# Patient Record
Sex: MALE | Race: BLACK OR AFRICAN AMERICAN
[De-identification: names, ages, dates, MRNs, and addresses within clinical notes are randomized per-mention and may not be internally consistent; named-entity substitution may affect disease eponyms.]

---

## 2017-09-07 ENCOUNTER — HOSPITAL ENCOUNTER (EMERGENCY)
Dept: HOSPITAL 72 - EMR | Age: 77
Discharge: HOME | End: 2017-09-07
Payer: MEDICARE

## 2017-09-07 VITALS — BODY MASS INDEX: 26.07 KG/M2 | WEIGHT: 176 LBS | HEIGHT: 69 IN

## 2017-09-07 VITALS — SYSTOLIC BLOOD PRESSURE: 104 MMHG | DIASTOLIC BLOOD PRESSURE: 57 MMHG
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
## 2017-09-07 DIAGNOSIS — I10: ICD-10-CM

## 2017-09-07 DIAGNOSIS — S83.91XA: Primary | ICD-10-CM
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
## 2017-09-07 DIAGNOSIS — E11.9: ICD-10-CM

## 2017-09-07 PROCEDURE — 99284 EMERGENCY DEPT VISIT MOD MDM: CPT

## 2017-09-07 NOTE — EMERGENCY ROOM REPORT
History of Present Illness


General


Chief Complaint:  Lower Extremity Injury


Source:  Patient, Medical Record





Present Illness


HPI


76 YO Male presents to the ED c/o Right knee pain 10/10 in severity localized 

to medial aspect with swelling x 1 day. Pt. reports twisting his knee and it 

gave out from under him with acute onset of pain when getting out of his 

vehicle yesterday afternoon. Patient denies skin color changes or a temperature 

changes to the distal portion of the affected extremity.  Patient denies 

previous injury.  Patient reports tenderness to the medial aspect of his knee.  

Patient states pain is exacerbated upon walking however he is currently using a 

cane and limping to avoid putting weight on his knee.  He denies hitting his 

head or loss of consciousness.  Denies fevers, chills, rash, abrasions or open 

lesions.  Denies numbness tingling or loss of sensation or gross motor 

movements of the extremities, incontinence of bowel or bladder. Denies CP, 

Palpitations, LOC, AMS, dizziness, Changes in Vision, Sensation, paresthesias, 

or a sudden severe headache.


Allergies:  


Coded Allergies:  


     No Known Allergies (Unverified , 9/7/17)





Patient History


Past Medical History:  see triage record


Past Surgical History:  none


Pertinent Family History:  none


Immunizations:  UTD


Reviewed Nursing Documentation:  PMH: Agreed, PSxH: Agreed





Nursing Documentation-PMH


Past Medical History:  No History, Except For


Hx Hypertension:  Yes


Hx Diabetes:  Yes





Review of Systems


All Other Systems:  negative except mentioned in HPI





Physical Exam





Vital Signs








  Date Time  Temp Pulse Resp B/P (MAP) Pulse Ox O2 Delivery O2 Flow Rate FiO2


 


9/7/17 15:01 98.1 83 16 96/51 99 Room Air  








Sp02 EP Interpretation:  reviewed, normal


General Appearance:  no apparent distress, alert, GCS 15, non-toxic


Head:  normocephalic, atraumatic


Eyes:  bilateral eye normal inspection, bilateral eye PERRL


ENT:  hearing grossly normal, normal voice


Neck:  full range of motion


Respiratory:  lungs clear, normal breath sounds, speaking full sentences


Cardiovascular #1:  regular rate, rhythm, no edema, normal capillary refill


Cardiovascular #2:  2+ dorsalis pedis (R), 2+ dorsalis pedis (L)


Rectal:  normal rectal tone


Musculoskeletal:  back normal, normal range of motion - with Pain upon full 

flexion and full extension of knee. , no calf tenderness, swelling - right 

medial knee, tender - right medial swelling and ttp, no posterior ttp, no 

pulsatile mass, mild bruise to the medial aspect of the right knee, significant 

pain with Varus stressing, negative anterior and posterior drawer sign. no 

increased temperature to palpation, distal pulses intact.


Neurologic:  alert, oriented x3, responsive, motor strength/tone normal, 

sensory intact, speech normal, grossly normal


Psychiatric:  judgement/insight normal, memory normal, mood/affect normal


Skin:  normal color, no rash, warm/dry, well hydrated





Medical Decision Making


PA Attestation


Dr. Hinson  is my supervising Physician whom patient management has been 

discussed with.


Diagnostic Impression:  


 Primary Impression:  


 Right knee sprain


 Qualified Codes:  S83.411A - Sprain of medial collateral ligament of right knee

, initial encounter


 Additional Impression:  


 Sprain of medial collateral ligament of knee


 Qualified Codes:  S83.411A - Sprain of medial collateral ligament of right knee

, initial encounter


ER Course


Pt. presents to the ED c/o Right knee pain 10/10 in severity localized to 

medial aspect with swelling x 1 day. Pt. reports twisting his knee and it gave 

out from under him with acute onset of pain when getting out of his vehicle 

yesterday afternoon.





Ddx considered but are not limited to Fracture, dislocation, contusion, 

epidural abscess, Sprain/Strain/Spasm





Vital signs: are WNL, pt. is afebrile


H&PE are most consistent with knee strain/ overuse. 





ORDERS:


--  X-ray Right knee 3 views    - negative for fx, Dislocation, or significant 

soft tissue injury, per official radiology report. 





ED INTERVENTIONS: 


-Knee Immobilizer applied  by ED tech. Pt. remains neurovascularly intact. 


-Pt is supplied a pair of crutches.


-Motrin PO - pt. is driving. 





Re-Evaluation: pt. states his pain has subsided with ED interventions 








-Pt. given a copy of his X-rays to take with him for PCP evaluation, d/w pt. he 

most likely will need an MRI which is done as an outpatient basis. gave ED 

return precautions with worsening or new symptoms. 





DISCHARGE: At this time pt. is stable for d/c to home. Will provide printed 

patient care instructions, and any necessary prescriptions. Care plan and 

follow up instructions have been discussed with the patient prior to discharge.





Last Vital Signs








  Date Time  Temp Pulse Resp B/P (MAP) Pulse Ox O2 Delivery O2 Flow Rate FiO2


 


9/7/17 15:01 98.1 83 16 96/51 99 Room Air  








Disposition:  HOME, SELF-CARE


Condition:  Stable


Scripts


Hydrocodone Bit/Acetaminophen 5-325* (NORCO 5-325*) 1 Each Tablet


1 TAB ORAL Q6H Y for For Pain, #9 TAB 0 Refills


   Prov: Anne Cooper         9/7/17 


Ibuprofen* (MOTRIN*) 600 Mg Tablet


600 MG ORAL THREE TIMES A DAY, #30 TAB 0 Refills


   Prov: Anne Cooper         9/7/17


Departure Forms:  Return to Work      Return to Work Date:  Sep 11, 2017


   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


   Other Restrictions:  light duty, limited use of the right knee, wear brace. 

x 1 week 


   Return to Full Activity:  Sep 18, 2017


Patient Instructions:  Combined Knee Ligament Sprain, Knee Sprain, Easy-to-Read

, Medial Collateral Knee Ligament Sprain With Phase I Rehab-SportsMed





Additional Instructions:  


Take medications as directed. 


 ** Follow up with a Primary Care Provider in 3-5 days, even if your symptoms 

have resolved. ** 


--Please review list of primary care clinics, if you do not already have a 

primary care provider





Return sooner to ED if new symptoms occur, or current symptoms become worse. 


Do not drink alcohol, drive, or operate heavy machinery while taking Norco as 

this may cause drowsiness. 











- Please note that this Emergency Department Report was dictated using [employer] technology software, occasionally this can lead to 

erroneous entry secondary to interpretation by the dictation equipment.











Anne Cooper Sep 7, 2017 16:08

## 2017-09-07 NOTE — DIAGNOSTIC IMAGING REPORT
Indication: PAIN



Technique: 3 views of the right knee



Comparison: None



Findings:There is mild lateral compartment degenerative joint space narrowing, with

some associated proliferative change. No acute fractures. There are degenerative

changes of the patellofemoral joint. No dislocations. No gross suprapatellar

effusion.



Impression:Degenerative changes, as described



No acute bony trauma

## 2017-10-04 ENCOUNTER — HOSPITAL ENCOUNTER (EMERGENCY)
Dept: HOSPITAL 72 - EMR | Age: 77
Discharge: HOME | End: 2017-10-04
Payer: MEDICARE

## 2017-10-04 VITALS — SYSTOLIC BLOOD PRESSURE: 138 MMHG | DIASTOLIC BLOOD PRESSURE: 72 MMHG

## 2017-10-04 VITALS — WEIGHT: 178 LBS | BODY MASS INDEX: 26.36 KG/M2 | HEIGHT: 69 IN

## 2017-10-04 DIAGNOSIS — I10: ICD-10-CM
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
## 2017-10-04 DIAGNOSIS — S16.1XXA: Primary | ICD-10-CM

## 2017-10-04 DIAGNOSIS — E11.9: ICD-10-CM

## 2017-10-04 PROCEDURE — 99283 EMERGENCY DEPT VISIT LOW MDM: CPT

## 2017-10-04 NOTE — EMERGENCY ROOM REPORT
History of Present Illness


General


Chief Complaint:  General Complaint


Source:  Patient





Present Illness


HPI


This is a 77-year-old male who presents with 2 layer a possible spider bite.  

He has some stiffness to his neck.  This started last night.  He thought it may 

be a spider bite even though he did not see any bite.  Reason for this was 10 

years ago he had a spider bite and had to go to hospital.  Denies any fever 

chills denies any nausea vomiting.  Worse with movement.  No other complaint.  

No chest pain.  No fever or chills


Allergies:  


Coded Allergies:  


     No Known Allergies (Unverified , 9/7/17)





Patient History


Past Medical History:  see triage record, old chart reviewed


Past Surgical History:  other


Pertinent Family History:  none


Social History:  Denies: smoking


Immunizations:  other


Reviewed Nursing Documentation:  PMH: Agreed, PSxH: Agreed





Nursing Documentation-PMH


Hx Hypertension:  Yes


Hx Diabetes:  Yes





Review of Systems


Eye:  Denies: eye pain, blurred vision


ENT:  Denies: ear pain, nose congestion, throat swelling


Respiratory:  Denies: cough, shortness of breath


Cardiovascular:  Denies: chest pain, palpitations


Gastrointestinal:  Denies: abdominal pain, diarrhea, nausea, vomiting


Musculoskeletal:  Denies: back pain, joint pain


Skin:  Denies: rash


Neurological:  Denies: headache, numbness


Endocrine:  Denies: increased thirst, increased urine


Hematologic/Lymphatic:  Denies: easy bruising


All Other Systems:  negative except mentioned in HPI





Physical Exam





Vital Signs








  Date Time  Temp Pulse Resp B/P (MAP) Pulse Ox O2 Delivery O2 Flow Rate FiO2


 


10/4/17 21:41 97.9 80 16 138/72 98 Room Air  





vitals normal


Sp02 EP Interpretation:  reviewed, normal


General Appearance:  well appearing, no apparent distress, alert


Head:  normocephalic, atraumatic


Eyes:  bilateral eye PERRL, bilateral eye EOMI


ENT:  hearing grossly normal, normal pharynx


Neck:  full range of motion, supple, no meningismus, tender - Minimal 

tenderness along the left sternocleidal mastoid and trapezial muscle.  No 

abscess.  No redness.  No Meningitis


Respiratory:  chest non-tender, lungs clear, normal breath sounds


Cardiovascular #1:  regular rate, rhythm, no murmur


Gastrointestinal:  normal bowel sounds, non tender, no mass, no organomegaly, 

no bruit, non-distended


Musculoskeletal:  back normal, gait/station normal, normal range of motion


Psychiatric:  mood/affect normal


Skin:  warm/dry





Medical Decision Making


Diagnostic Impression:  


 Primary Impression:  


 Neck strain


 Qualified Codes:  S16.1XXA - Strain of muscle, fascia and tendon at neck level

, initial encounter


ER Course


Patient with a muscle strain.  No fracture, dislocation, meningitis, 

cellulitis.  I see no evidence of any bite.





Last Vital Signs








  Date Time  Temp Pulse Resp B/P (MAP) Pulse Ox O2 Delivery O2 Flow Rate FiO2


 


10/4/17 21:41 97.9 80 16 138/72 98 Room Air  








Status:  unchanged


Disposition:  HOME, SELF-CARE


Condition:  Stable


Scripts


Ibuprofen* (MOTRIN*) 600 Mg Tablet


600 MG ORAL Q8H Y for For Pain, #30 TAB 0 Refills


   Prov: VISHNU MENDOZA M.D.         10/4/17





Additional Instructions:  


Followup with your Dr. in 7 days.  Return if symptom worsen.











VISHNU MENDOZA M.D. Oct 4, 2017 21:57

## 2017-11-07 ENCOUNTER — HOSPITAL ENCOUNTER (EMERGENCY)
Dept: HOSPITAL 72 - EMR | Age: 77
Discharge: HOME | End: 2017-11-07
Payer: MEDICARE

## 2017-11-07 VITALS — BODY MASS INDEX: 25.48 KG/M2 | HEIGHT: 69 IN | WEIGHT: 172 LBS

## 2017-11-07 VITALS — DIASTOLIC BLOOD PRESSURE: 70 MMHG | SYSTOLIC BLOOD PRESSURE: 124 MMHG

## 2017-11-07 DIAGNOSIS — I10: ICD-10-CM

## 2017-11-07 DIAGNOSIS — L03.031: Primary | ICD-10-CM

## 2017-11-07 DIAGNOSIS — E11.9: ICD-10-CM

## 2017-11-07 PROCEDURE — 99283 EMERGENCY DEPT VISIT LOW MDM: CPT

## 2017-11-07 NOTE — EMERGENCY ROOM REPORT
History of Present Illness


General


Chief Complaint:  Skin Rash/Abscess


Source:  Patient





Present Illness


HPI


The patient is a 77-year-old male presenting for right possible toe infection.  

He states that he was cleaning his feet approximately 1 week prior and had a 

small laceration of the 5th digit.  He then noticed redness to the area.  

Symptoms have progressed and he states that it is now swollen and there is some 

slight discharge from the area.  Pain is a 7/10 dull ache and is worse with 

touch.  He denies any numbness or tingling.  He denies any radiating pain.  He 

denies any fever or chills


Allergies:  


Coded Allergies:  


     No Known Allergies (Unverified , 9/7/17)





Patient History


Past Medical History:  see triage record


Pertinent Family History:  none


Reviewed Nursing Documentation:  PMH: Agreed, PSxH: Agreed





Nursing Documentation-PMH


Hx Hypertension:  Yes


Hx Diabetes:  Yes





Review of Systems


All Other Systems:  negative except mentioned in HPI





Physical Exam





Vital Signs








  Date Time  Temp Pulse Resp B/P (MAP) Pulse Ox O2 Delivery O2 Flow Rate FiO2


 


11/7/17 14:18 98.1 74 17 124/70 94 Room Air  








Sp02 EP Interpretation:  reviewed, normal


General Appearance:  no apparent distress, alert, GCS 15, non-toxic


Head:  normocephalic, atraumatic


Eyes:  bilateral eye normal inspection, bilateral eye PERRL


ENT:  hearing grossly normal, normal pharynx, no angioedema, normal voice


Neck:  full range of motion, supple/symm/no masses


Respiratory:  chest non-tender, lungs clear, normal breath sounds, speaking 

full sentences


Musculoskeletal:  back normal, gait/station normal, normal range of motion, 

other - R 5th digit erythema. TTP. SILT. White DC medially. Full AROM


Neurologic:  alert, oriented x3, responsive, motor strength/tone normal, 

sensory intact, speech normal


Psychiatric:  judgement/insight normal, memory normal, mood/affect normal, no 

suicidal/homicidal ideation


Skin:  rash - R 5th digit erythema. TTP. SILT. White DC medially. Full AROM


Lymphatic:  no adenopathy





Medical Decision Making


PA Attestation


Dr. whitehead is my supervising physician. Patient management was discussed with 

my supervising physician


Diagnostic Impression:  


 Primary Impression:  


 Cellulitis


 Qualified Codes:  L03.031 - Cellulitis of right toe


ER Course


The patient is a 77-year-old male presenting for right possible toe infection





Differential diagnoses considered but not limited to: abscess, cellulitis, 

insect bite, gangrene, among others





PE: Afebrile. NAD


R 5th digit erythema. TTP. SILT. White DC medially. Full AROM





The area is cleaned and the patient is given prescriptions for Bactrim and 

Keflex.  He was given strict ER precautions to return including if redness 

worsens, pain worsens, he experiences fever, or for any other reason





Last Vital Signs








  Date Time  Temp Pulse Resp B/P (MAP) Pulse Ox O2 Delivery O2 Flow Rate FiO2


 


11/7/17 15:05 98.1 88 17 124/70 94 Room Air  








Status:  improved


Disposition:  HOME, SELF-CARE


Condition:  Improved


Scripts


Trimethoprim/Sulfamethoxazole 160/800* (BACTRIM DS TABLET*) 1 Each Tablet


1 TAB ORAL TWICE A DAY, #14 TAB


   Prov: BATOOL BAKER.MAYA         11/7/17 


Cephalexin* (KEFLEX*) 500 Mg Capsule


500 MG ORAL EVERY 12 HOURS, #14 CAP 0 Refills


   Prov: BATOOL BAKER P.A.         11/7/17


Patient Instructions:  Cellulitis





Additional Instructions:  


I discussed my findings with the patient. All questions and concerns have been 

answered. Treatment and medication compliance have been addressed. I advised 

the patient that they need to follow up with PMD in 3-5 days. Return to ED if 

symptoms worsen, new symptoms arise, or if needed for any reason. Patient 

verbalized understanding of discharge instructions.





Please return to emergency Department if you experience fever, chills, 

worsening infection, numbness, open wound, or for any reason











BATOOL BAKER Nov 7, 2017 16:40

## 2019-08-18 ENCOUNTER — HOSPITAL ENCOUNTER (EMERGENCY)
Dept: HOSPITAL 72 - EMR | Age: 79
LOS: 1 days | Discharge: HOME | End: 2019-08-19
Payer: MEDICARE

## 2019-08-18 VITALS — WEIGHT: 170 LBS | HEIGHT: 69 IN | BODY MASS INDEX: 25.18 KG/M2

## 2019-08-18 VITALS — DIASTOLIC BLOOD PRESSURE: 73 MMHG | SYSTOLIC BLOOD PRESSURE: 123 MMHG

## 2019-08-18 DIAGNOSIS — I10: ICD-10-CM

## 2019-08-18 DIAGNOSIS — N28.9: ICD-10-CM

## 2019-08-18 DIAGNOSIS — E11.9: ICD-10-CM

## 2019-08-18 DIAGNOSIS — M17.12: Primary | ICD-10-CM

## 2019-08-18 DIAGNOSIS — Z79.84: ICD-10-CM

## 2019-08-18 LAB
ADD MANUAL DIFF: NO
ALBUMIN SERPL-MCNC: 3.9 G/DL (ref 3.4–5)
ALBUMIN/GLOB SERPL: 0.9 {RATIO} (ref 1–2.7)
ALP SERPL-CCNC: 76 U/L (ref 46–116)
ALT SERPL-CCNC: 27 U/L (ref 12–78)
ANION GAP SERPL CALC-SCNC: 5 MMOL/L (ref 5–15)
AST SERPL-CCNC: 16 U/L (ref 15–37)
BASOPHILS NFR BLD AUTO: 0.9 % (ref 0–2)
BILIRUB SERPL-MCNC: 0.5 MG/DL (ref 0.2–1)
BUN SERPL-MCNC: 22 MG/DL (ref 7–18)
CALCIUM SERPL-MCNC: 9.1 MG/DL (ref 8.5–10.1)
CHLORIDE SERPL-SCNC: 105 MMOL/L (ref 98–107)
CO2 SERPL-SCNC: 31 MMOL/L (ref 21–32)
CREAT SERPL-MCNC: 1.3 MG/DL (ref 0.55–1.3)
EOSINOPHIL NFR BLD AUTO: 3.3 % (ref 0–3)
ERYTHROCYTE [DISTWIDTH] IN BLOOD BY AUTOMATED COUNT: 12.1 % (ref 11.6–14.8)
GLOBULIN SER-MCNC: 4.2 G/DL
HCT VFR BLD CALC: 39 % (ref 42–52)
HGB BLD-MCNC: 12.9 G/DL (ref 14.2–18)
LYMPHOCYTES NFR BLD AUTO: 21.9 % (ref 20–45)
MCV RBC AUTO: 87 FL (ref 80–99)
MONOCYTES NFR BLD AUTO: 9.5 % (ref 1–10)
NEUTROPHILS NFR BLD AUTO: 64.4 % (ref 45–75)
PLATELET # BLD: 265 K/UL (ref 150–450)
POTASSIUM SERPL-SCNC: 3.7 MMOL/L (ref 3.5–5.1)
RBC # BLD AUTO: 4.47 M/UL (ref 4.7–6.1)
SODIUM SERPL-SCNC: 141 MMOL/L (ref 136–145)
WBC # BLD AUTO: 7.7 K/UL (ref 4.8–10.8)

## 2019-08-18 PROCEDURE — 99283 EMERGENCY DEPT VISIT LOW MDM: CPT

## 2019-08-18 PROCEDURE — 85025 COMPLETE CBC W/AUTO DIFF WBC: CPT

## 2019-08-18 PROCEDURE — 80053 COMPREHEN METABOLIC PANEL: CPT

## 2019-08-18 PROCEDURE — 84550 ASSAY OF BLOOD/URIC ACID: CPT

## 2019-08-18 PROCEDURE — 36415 COLL VENOUS BLD VENIPUNCTURE: CPT

## 2019-08-18 NOTE — EMERGENCY ROOM REPORT
History of Present Illness


General


Chief Complaint:  Pain


Source:  Patient





Present Illness


HPI


Patient presents with 1 week of left knee pain.  Also the swelling.  The knee 

gives out on him when he gets out of a car.  There is no known trauma.  In the 

past these had swelling of the opposite knee.  He denies fevers or chills.  He 

is treated for high blood pressure.  He is not sure if he takes a water pill.  

He has no history of gout in the past.  There is no calf swelling.  There is no 

numbness.  He denies chest pain, shortness of breath, nausea, vomiting, diarrhea

, melena, hematochezia, dysuria or hematuria.  He has been using a brace.  He 

takes Tylenol No. 3 that causes him to be constipated.  This does help the 

pain.  He rates the pain 10/10.  Is aching worse when he weight bears.  It does 

not radiate.  He walks with a cane.





History of diabetes on metformin 


History of hypertension on medication


Allergies:  


Coded Allergies:  


     No Known Allergies (Unverified , 9/7/17)





Patient History


Past Medical History:  see triage record


Social History:  Denies: smoking


Social History Narrative


from home


Reviewed Nursing Documentation:  PMH: Agreed; PSxH: Agreed





Nursing Documentation-PMH


Past Medical History:  No History, Except For


Hx Hypertension:  Yes


Hx Diabetes:  Yes





Review of Systems


All Other Systems:  negative except mentioned in HPI





Physical Exam





Vital Signs








  Date Time  Temp Pulse Resp B/P (MAP) Pulse Ox O2 Delivery O2 Flow Rate FiO2


 


8/18/19 21:37 98.2 73 18 123/73 (90) 93 Room Air  








Sp02 EP Interpretation:  reviewed, normal


General Appearance:  well appearing, no apparent distress, GCS 15


Head:  normocephalic


Eyes:  bilateral eye normal inspection, bilateral eye PERRL, bilateral eye EOMI


ENT:  moist mucus membranes


Cardiovascular #1:  regular rate, rhythm


Gastrointestinal:  normal inspection


Musculoskeletal:  no calf tenderness, decreased range of mation - Flexion the, 

swelling - effusion, tenderness - Elicited with testing for drawer however no 

drawer sign exists, ligaments are stable.  No meniscus tenderness


Neurologic:  alert, oriented x3, grossly normal


Psychiatric:  mood/affect normal


Skin:  normal color, no rash, warm/dry





Medical Decision Making


Diagnostic Impression:  


 Primary Impression:  


 Osteoarthritis of left knee


 Qualified Codes:  M17.12 - Unilateral primary osteoarthritis, left knee


 Additional Impression:  


 Renal insufficiency


ER Course


Knee x-rays with patient presents with left knee pain without obvious trauma.  

Differential includes sprain, osteoarthritis, gout amongst others.  X-rays 

indicated.  Also labs will be obtained to rule out gout.  The patient will be 

treated with Motrin





Osteoarthritis and minimal effusion.  Labs significant for normal white count 

and uric acid.Minimally elevated creatinine.





Improved with treatment.





Patient applied by me with good position and tension.  Improved with Ace.  

Distal neurovascular exam normal as checked by me.





Patient stable for outpatient observation and treatment.





Laboratory Tests








Test


  8/18/19


20:20


 


White Blood Count


  7.7 K/UL


(4.8-10.8)


 


Red Blood Count


  4.47 M/UL


(4.70-6.10)  L


 


Hemoglobin


  12.9 G/DL


(14.2-18.0)  L


 


Hematocrit


  39.0 %


(42.0-52.0)  L


 


Mean Corpuscular Volume 87 FL (80-99)  


 


Mean Corpuscular Hemoglobin


  28.9 PG


(27.0-31.0)


 


Mean Corpuscular Hemoglobin


Concent 33.2 G/DL


(32.0-36.0)


 


Red Cell Distribution Width


  12.1 %


(11.6-14.8)


 


Platelet Count


  265 K/UL


(150-450)


 


Mean Platelet Volume


  5.5 FL


(6.5-10.1)  L


 


Neutrophils (%) (Auto)


  64.4 %


(45.0-75.0)


 


Lymphocytes (%) (Auto)


  21.9 %


(20.0-45.0)


 


Monocytes (%) (Auto)


  9.5 %


(1.0-10.0)


 


Eosinophils (%) (Auto)


  3.3 %


(0.0-3.0)  H


 


Basophils (%) (Auto)


  0.9 %


(0.0-2.0)


 


Sodium Level


  141 MMOL/L


(136-145)


 


Potassium Level


  3.7 MMOL/L


(3.5-5.1)


 


Chloride Level


  105 MMOL/L


()


 


Carbon Dioxide Level


  31 MMOL/L


(21-32)


 


Anion Gap


  5 mmol/L


(5-15)


 


Blood Urea Nitrogen


  22 mg/dL


(7-18)  H


 


Creatinine


  1.3 MG/DL


(0.55-1.30)


 


Estimate Glomerular


Filtration Rate  mL/min (>60)  


 


 


Glucose Level


  104 MG/DL


()


 


Uric Acid


  6.4 MG/DL


(2.6-7.2)


 


Calcium Level


  9.1 MG/DL


(8.5-10.1)


 


Total Bilirubin


  0.5 MG/DL


(0.2-1.0)


 


Aspartate Amino Transferase


(AST) 16 U/L (15-37)


 


 


Alanine Aminotransferase (ALT)


  27 U/L (12-78)


 


 


Alkaline Phosphatase


  76 U/L


()


 


Total Protein


  8.1 G/DL


(6.4-8.2)


 


Albumin


  3.9 G/DL


(3.4-5.0)


 


Globulin 4.2 g/dL  


 


Albumin/Globulin Ratio


  0.9 (1.0-2.7)


L








Other X-Ray Diagnostic Results


Other X-Ray Diagnostic Results :  


   X-Ray ordered:  Left knee


   # of Views/Limited Vs Complete:  3 View


   Indication:  Other


   Interpretation:  no dislocation, no fractures, other - Effusion and 

degenerative arthritis


   Impression:  Other


   Electronically Signed by:  Electronically signed by Abiodun Spangler MD





Last Vital Signs








  Date Time  Temp Pulse Resp B/P (MAP) Pulse Ox O2 Delivery O2 Flow Rate FiO2


 


8/19/19 00:30 98.2 80 18 123/73 93 Room Air  








Status:  improved


Disposition:  HOME, SELF-CARE


Condition:  Improved


Scripts


Lactulose (LACTULOSE*) 20 Gm/30 Ml Solution


30 ML ORAL BID PRN for constipation, #240 ML 1 Refill


   Prov: Abiodun Spangler MD         8/19/19 


Hydrocodone Bit/Acetaminophen 5-325* (NORCO 5-325*) 1 Each Tablet


1 TAB ORAL Q6H PRN for For Pain, #10 TAB 0 Refills


   Prov: Abiodun Spangler MD         8/19/19 


Ibuprofen* (MOTRIN*) 600 Mg Tablet


600 MG ORAL Q6H PRN for For Pain, #20 TAB 0 Refills


   Prov: Abiodun Spangler MD         8/19/19











Abiodun Spangler MD Aug 18, 2019 21:45

## 2019-08-19 VITALS — SYSTOLIC BLOOD PRESSURE: 123 MMHG | DIASTOLIC BLOOD PRESSURE: 73 MMHG

## 2019-08-19 NOTE — DIAGNOSTIC IMAGING REPORT
Indication: Reason For Exam: PAIN

 

Technique: 3 views of the left knee

 

Comparison: None

 

Findings: There is minimal degenerative narrowing of the medial joint compartment. No

suprapatellar effusion. No acute fractures. No dislocations.

 

Impression: Minimal degenerative changes. No acute bony trauma

 

This agrees with the preliminary interpretation provided by the emergency room

physician